# Patient Record
Sex: FEMALE | Race: WHITE | HISPANIC OR LATINO | URBAN - METROPOLITAN AREA
[De-identification: names, ages, dates, MRNs, and addresses within clinical notes are randomized per-mention and may not be internally consistent; named-entity substitution may affect disease eponyms.]

---

## 2021-05-25 ENCOUNTER — IMMUNIZATIONS (OUTPATIENT)
Dept: FAMILY MEDICINE CLINIC | Facility: HOSPITAL | Age: 46
End: 2021-05-25

## 2021-05-25 DIAGNOSIS — Z23 ENCOUNTER FOR IMMUNIZATION: Primary | ICD-10-CM

## 2021-05-25 PROCEDURE — 0001A SARS-COV-2 / COVID-19 MRNA VACCINE (PFIZER-BIONTECH) 30 MCG: CPT

## 2021-05-25 PROCEDURE — 91300 SARS-COV-2 / COVID-19 MRNA VACCINE (PFIZER-BIONTECH) 30 MCG: CPT

## 2021-06-18 ENCOUNTER — IMMUNIZATIONS (OUTPATIENT)
Dept: FAMILY MEDICINE CLINIC | Facility: HOSPITAL | Age: 46
End: 2021-06-18

## 2021-06-18 DIAGNOSIS — Z23 ENCOUNTER FOR IMMUNIZATION: Primary | ICD-10-CM

## 2021-06-18 PROCEDURE — 91300 SARS-COV-2 / COVID-19 MRNA VACCINE (PFIZER-BIONTECH) 30 MCG: CPT

## 2021-06-18 PROCEDURE — 0002A SARS-COV-2 / COVID-19 MRNA VACCINE (PFIZER-BIONTECH) 30 MCG: CPT

## 2023-10-10 ENCOUNTER — TELEPHONE (OUTPATIENT)
Dept: OTHER | Facility: OTHER | Age: 48
End: 2023-10-10

## 2023-10-10 NOTE — TELEPHONE ENCOUNTER
PT. Called in stating she couldn't get through to office since yesterday and was she having issues logging in to [facility]. PT was trying to cancel her appointment she had today, 10/10 @ 7. Please call her back to reschedule.

## 2023-11-03 ENCOUNTER — OFFICE VISIT (OUTPATIENT)
Dept: FAMILY MEDICINE CLINIC | Facility: CLINIC | Age: 48
End: 2023-11-03
Payer: COMMERCIAL

## 2023-11-03 VITALS
DIASTOLIC BLOOD PRESSURE: 80 MMHG | SYSTOLIC BLOOD PRESSURE: 112 MMHG | HEIGHT: 62 IN | TEMPERATURE: 97 F | WEIGHT: 196 LBS | HEART RATE: 73 BPM | BODY MASS INDEX: 36.07 KG/M2 | OXYGEN SATURATION: 99 %

## 2023-11-03 DIAGNOSIS — Z13.6 SCREENING FOR CARDIOVASCULAR CONDITION: ICD-10-CM

## 2023-11-03 DIAGNOSIS — R58 EXCESSIVE BLEEDING: ICD-10-CM

## 2023-11-03 DIAGNOSIS — Z12.31 SCREENING MAMMOGRAM, ENCOUNTER FOR: ICD-10-CM

## 2023-11-03 DIAGNOSIS — L81.1 MELASMA: ICD-10-CM

## 2023-11-03 DIAGNOSIS — E55.9 VITAMIN D DEFICIENCY: ICD-10-CM

## 2023-11-03 DIAGNOSIS — Z12.11 SCREEN FOR COLON CANCER: ICD-10-CM

## 2023-11-03 DIAGNOSIS — F90.2 ATTENTION DEFICIT HYPERACTIVITY DISORDER (ADHD), COMBINED TYPE: ICD-10-CM

## 2023-11-03 DIAGNOSIS — E66.9 OBESITY (BMI 35.0-39.9 WITHOUT COMORBIDITY): Primary | ICD-10-CM

## 2023-11-03 DIAGNOSIS — F41.9 ANXIETY: ICD-10-CM

## 2023-11-03 PROCEDURE — 99204 OFFICE O/P NEW MOD 45 MIN: CPT

## 2023-11-03 RX ORDER — METHOCARBAMOL 500 MG/1
TABLET, FILM COATED ORAL
Qty: 30 TABLET | Refills: 0 | Status: SHIPPED | OUTPATIENT
Start: 2023-11-03

## 2023-11-03 RX ORDER — HYDROXYZINE HYDROCHLORIDE 10 MG/1
TABLET, FILM COATED ORAL
Qty: 30 TABLET | Refills: 0 | Status: SHIPPED | OUTPATIENT
Start: 2023-11-03

## 2023-11-03 RX ORDER — DEXTROAMPHETAMINE SACCHARATE, AMPHETAMINE ASPARTATE, DEXTROAMPHETAMINE SULFATE AND AMPHETAMINE SULFATE 2.5; 2.5; 2.5; 2.5 MG/1; MG/1; MG/1; MG/1
10 TABLET ORAL
Qty: 60 TABLET | Refills: 0 | Status: SHIPPED | OUTPATIENT
Start: 2023-11-03

## 2023-11-03 NOTE — ASSESSMENT & PLAN NOTE
Has been on Ozempic for approximately 2 months. Has lost 10 pounds. We will reach out to insurance to see if they will cover [facility]. Have all fasting lab work we will call with results. If [facility] is covered we will send 1 mg weekly patient will verify that pharmacy has the 1 mg dose. She has been dieting and exercising and this has not proved to help with weight loss. She also does intermittent fasting. Follow-up in 1 month. Suggest lean protein high fiber diet. 5-9 vegetables a day. Do not drink sugary drinks.

## 2023-11-03 NOTE — ASSESSMENT & PLAN NOTE
Excessive uterine bleeding and pain with menstrual period. Please have ultrasound. We will send a muscle rack ask her to use sparingly as needed during menstrual period.

## 2023-11-03 NOTE — ASSESSMENT & PLAN NOTE
Practices self regulation. Has taken Ativan and Xanax in the past to help with anxiety. Will start hydroxyzine 1 to 2 tablets as needed for anxiety. Follow-up in 1 month. At this time is not interested in seeing a therapist or starting a daily medicine.

## 2023-11-03 NOTE — ASSESSMENT & PLAN NOTE
Patient with history of dark patches on skin. She has tried multiple over-the-counter's without success. We will start low-dose tretinoin nightly follow-up in 1 month. She will verify with insurance to make sure it is covered.

## 2023-11-03 NOTE — PROGRESS NOTES
Assessment/Plan:         Problem List Items Addressed This Visit        Musculoskeletal and Integument    Melasma     Patient with history of dark patches on skin. She has tried multiple over-the-counter's without success. We will start low-dose tretinoin nightly follow-up in 1 month. She will verify with insurance to make sure it is covered. Relevant Medications    tretinoin (RETIN-A) 0.025 % cream       Other    Obesity (BMI 35.0-39.9 without comorbidity) - Primary     Has been on Ozempic for approximately 2 months. Has lost 10 pounds. We will reach out to insurance to see if they will cover [facility]. Have all fasting lab work we will call with results. If [facility] is covered we will send 1 mg weekly patient will verify that pharmacy has the 1 mg dose. She has been dieting and exercising and this has not proved to help with weight loss. She also does intermittent fasting. Follow-up in 1 month. Suggest lean protein high fiber diet. 5-9 vegetables a day. Do not drink sugary drinks. Relevant Medications    Semaglutide-Weight Management (WEGOVY) 1 MG/0.5ML    Other Relevant Orders    CBC and differential    Comprehensive metabolic panel    Lipid panel    TSH, 3rd generation with Free T4 reflex    Vitamin D 25 hydroxy    Iron Panel (Includes Ferritin, Iron Sat%, Iron, and TIBC)    Excessive bleeding     Excessive uterine bleeding and pain with menstrual period. Please have ultrasound. We will send a muscle rack ask her to use sparingly as needed during menstrual period. Relevant Medications    methocarbamol (ROBAXIN) 500 mg tablet    Other Relevant Orders    US abdomen and pelvis with transvaginal    Attention deficit hyperactivity disorder (ADHD), combined type     Screening positive today. Scanned into chart. Was on Adderall in the past.  Felt that Adderall dose was possibly too high but the medication itself worked well.   Will start low-dose short acting Adderall once to twice daily as needed. Take with food. Call with any side effects. Follow-up in 1 month. Relevant Medications    Semaglutide-Weight Management (WEGOVY) 1 MG/0.5ML    hydrOXYzine HCL (ATARAX) 10 mg tablet    amphetamine-dextroamphetamine (ADDERALL, 10MG,) 10 mg tablet    Anxiety     Practices self regulation. Has taken Ativan and Xanax in the past to help with anxiety. Will start hydroxyzine 1 to 2 tablets as needed for anxiety. Follow-up in 1 month. At this time is not interested in seeing a therapist or starting a daily medicine. Relevant Medications    hydrOXYzine HCL (ATARAX) 10 mg tablet   Other Visit Diagnoses     Screening for cardiovascular condition        Have lab work we will call with results. Relevant Orders    CBC and differential    Comprehensive metabolic panel    Lipid panel    TSH, 3rd generation with Free T4 reflex    Vitamin D deficiency        Have lab work we will call with results    Relevant Orders    Vitamin D 25 hydroxy    Screening mammogram, encounter for        Have mammogram we will call with results    Relevant Orders    Mammo screening bilateral w 3d & cad    Screen for colon cancer        Please make appointment with GI. Relevant Orders    Ambulatory Referral to Gastroenterology              Subjective:      Patient ID: Mary Godoy is a 56 y.o. female. Moraima Jax is here to establish, she has been taking ozempic and has lost 10 lbs, she is doing . 5mg right now. She lost a lot a lot of weight in the past and feels best at 170 lbs. She hasn't seen a provider since 2019. She also suffers from anxiety and ADHD and it is affecting her work. 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 The anxiety gives her stomach pain/cramps. She does have chest pain either from stress or period.          The following portions of the patient's history were reviewed and updated as appropriate:   Past Medical History:  She has no past medical history on file.,  _______________________________________________________________________  Medical Problems:  does not have any pertinent problems on file.,  _______________________________________________________________________  Past Surgical History:   has no past surgical history on file.,  _______________________________________________________________________  Family History:  family history is not on file.,  _______________________________________________________________________  Social History:   reports that she has never smoked. She uses smokeless tobacco. She reports that she does not currently use alcohol. No history on file for drug use.,  _______________________________________________________________________  Allergies:  has No Known Allergies. .  _______________________________________________________________________  Current Outpatient Medications   Medication Sig Dispense Refill   • amphetamine-dextroamphetamine (ADDERALL, 10MG,) 10 mg tablet Take 1 tablet (10 mg total) by mouth 2 (two) times a day Max Daily Amount: 20 mg 60 tablet 0   • hydrOXYzine HCL (ATARAX) 10 mg tablet Take 1-2 tablets as needed for anxiety every 6 hours. 30 tablet 0   • methocarbamol (ROBAXIN) 500 mg tablet Take 1-2 tablets as needed for muscular pain during menstrual period every 8 hours. Caution with driving and avoid all alcohol use while taking medicine. 30 tablet 0   • semaglutide, 0.25 or 0.5 mg/dose, (Ozempic, 0.25 or 0.5 MG/DOSE,) 2 mg/1.5 mL injection pen Inject under the skin every 7 days     • Semaglutide-Weight Management (WEGOVY) 1 MG/0.5ML Inject 0.5 mL (1 mg total) under the skin once a week 2 mL 0   • tretinoin (RETIN-A) 0.025 % cream Apply topically daily at bedtime 45 g 1     No current facility-administered medications for this visit.     _______________________________________________________________________  Review of Systems   Constitutional:  Negative for chills, diaphoresis, fatigue and fever. HENT:  Negative for congestion, ear pain, postnasal drip, rhinorrhea, sinus pressure, sinus pain and sore throat. Eyes:  Negative for pain and visual disturbance. Respiratory:  Negative for cough, chest tightness, shortness of breath and wheezing. Cardiovascular:  Positive for chest pain. Negative for palpitations. Gastrointestinal:  Positive for abdominal pain. Negative for constipation, diarrhea, nausea and vomiting. Genitourinary:  Positive for menstrual problem. Negative for dysuria, frequency and hematuria. Musculoskeletal:  Negative for arthralgias and back pain. Skin:  Negative for color change and rash. Neurological:  Negative for dizziness, seizures, syncope, light-headedness and headaches. Psychiatric/Behavioral:  Positive for sleep disturbance. Negative for dysphoric mood. The patient is nervous/anxious. All other systems reviewed and are negative. Objective:  Vitals:    11/03/23 0721   BP: 112/80   BP Location: Left arm   Patient Position: Sitting   Cuff Size: Standard   Pulse: 73   Temp: (!) 97 °F (36.1 °C)   TempSrc: Tympanic   SpO2: 99%   Weight: 88.9 kg (196 lb)   Height: 5' 2" (1.575 m)     Body mass index is 35.85 kg/m². Physical Exam  Vitals and nursing note reviewed. Constitutional:       General: She is not in acute distress. Appearance: Normal appearance. She is not ill-appearing. HENT:      Head: Normocephalic. Right Ear: Tympanic membrane, ear canal and external ear normal. There is no impacted cerumen. Left Ear: Tympanic membrane, ear canal and external ear normal. There is no impacted cerumen. Nose: Nose normal. No congestion. Mouth/Throat:      Mouth: Mucous membranes are moist.      Pharynx: No posterior oropharyngeal erythema. Eyes:      Extraocular Movements: Extraocular movements intact. Conjunctiva/sclera: Conjunctivae normal.      Pupils: Pupils are equal, round, and reactive to light.    Cardiovascular:      Rate and Rhythm: Normal rate and regular rhythm. Pulses: Normal pulses. Heart sounds: Normal heart sounds. Pulmonary:      Effort: Pulmonary effort is normal. No respiratory distress. Breath sounds: Normal breath sounds. No wheezing. Abdominal:      General: Bowel sounds are normal.      Palpations: Abdomen is soft. Musculoskeletal:         General: No swelling or tenderness. Normal range of motion. Cervical back: Normal range of motion. No tenderness. Right lower leg: No edema. Left lower leg: No edema. Lymphadenopathy:      Cervical: No cervical adenopathy. Skin:     General: Skin is warm and dry. Neurological:      General: No focal deficit present. Mental Status: She is alert and oriented to person, place, and time. Psychiatric:         Mood and Affect: Mood normal.         Behavior: Behavior normal.         Thought Content:  Thought content normal.         Judgment: Judgment normal.

## 2023-11-03 NOTE — ASSESSMENT & PLAN NOTE
Screening positive today. Scanned into chart. Was on Adderall in the past.  Felt that Adderall dose was possibly too high but the medication itself worked well. Will start low-dose short acting Adderall once to twice daily as needed. Take with food. Call with any side effects. Follow-up in 1 month.

## 2023-11-06 ENCOUNTER — TELEPHONE (OUTPATIENT)
Dept: FAMILY MEDICINE CLINIC | Facility: CLINIC | Age: 48
End: 2023-11-06

## 2023-11-06 NOTE — TELEPHONE ENCOUNTER
Prior auth started for [facility] via [address].  Key: [id number]

## 2023-12-04 DIAGNOSIS — F90.2 ATTENTION DEFICIT HYPERACTIVITY DISORDER (ADHD), COMBINED TYPE: ICD-10-CM

## 2023-12-04 DIAGNOSIS — F41.9 ANXIETY: ICD-10-CM

## 2023-12-04 DIAGNOSIS — L81.1 MELASMA: ICD-10-CM

## 2023-12-04 RX ORDER — HYDROXYZINE HYDROCHLORIDE 10 MG/1
TABLET, FILM COATED ORAL
Qty: 30 TABLET | Refills: 0 | Status: SHIPPED | OUTPATIENT
Start: 2023-12-04

## 2023-12-04 RX ORDER — DEXTROAMPHETAMINE SACCHARATE, AMPHETAMINE ASPARTATE, DEXTROAMPHETAMINE SULFATE AND AMPHETAMINE SULFATE 2.5; 2.5; 2.5; 2.5 MG/1; MG/1; MG/1; MG/1
10 TABLET ORAL
Qty: 60 TABLET | Refills: 0 | Status: SHIPPED | OUTPATIENT
Start: 2023-12-04

## 2024-01-12 DIAGNOSIS — F90.2 ATTENTION DEFICIT HYPERACTIVITY DISORDER (ADHD), COMBINED TYPE: ICD-10-CM

## 2024-01-12 RX ORDER — DEXTROAMPHETAMINE SACCHARATE, AMPHETAMINE ASPARTATE, DEXTROAMPHETAMINE SULFATE AND AMPHETAMINE SULFATE 2.5; 2.5; 2.5; 2.5 MG/1; MG/1; MG/1; MG/1
10 TABLET ORAL
Qty: 60 TABLET | Refills: 0 | Status: SHIPPED | OUTPATIENT
Start: 2024-01-12 | End: 2024-01-19

## 2024-01-12 NOTE — TELEPHONE ENCOUNTER
Spoke with patient- she is COMPLETELY OUT of her medication, has been for 4 days. Can someone call this in for her today on Tonya's behalf?    Also. She would appreciate a call back from Tonya next week in regard to her anxiety medication. She does not know why she is on that particular medication.     Please advise, thank you!

## 2024-01-19 ENCOUNTER — TELEPHONE (OUTPATIENT)
Dept: FAMILY MEDICINE CLINIC | Facility: CLINIC | Age: 49
End: 2024-01-19

## 2024-01-19 DIAGNOSIS — F41.9 ANXIETY: Primary | ICD-10-CM

## 2024-01-19 DIAGNOSIS — F90.2 ATTENTION DEFICIT HYPERACTIVITY DISORDER (ADHD), COMBINED TYPE: ICD-10-CM

## 2024-01-19 RX ORDER — DEXTROAMPHETAMINE SACCHARATE, AMPHETAMINE ASPARTATE MONOHYDRATE, DEXTROAMPHETAMINE SULFATE AND AMPHETAMINE SULFATE 2.5; 2.5; 2.5; 2.5 MG/1; MG/1; MG/1; MG/1
10 CAPSULE, EXTENDED RELEASE ORAL EVERY MORNING
Qty: 15 CAPSULE | Refills: 0 | Status: SHIPPED | OUTPATIENT
Start: 2024-01-19

## 2024-01-19 RX ORDER — ALPRAZOLAM 0.25 MG/1
0.25 TABLET ORAL 3 TIMES DAILY PRN
Qty: 30 TABLET | Refills: 0 | Status: SHIPPED | OUTPATIENT
Start: 2024-01-19

## 2024-01-19 NOTE — TELEPHONE ENCOUNTER
Patient called stating she would like to speak with Tonya regarding her Hydroxyzine 10 mg (patient says it is her anxiety meds. She doesn't think they are working. Asked if she would like to make an appt with Tonya to discuss but she is unable. Please advise  [facility]

## 2024-02-17 DIAGNOSIS — L81.1 MELASMA: ICD-10-CM

## 2024-02-17 DIAGNOSIS — F90.2 ATTENTION DEFICIT HYPERACTIVITY DISORDER (ADHD), COMBINED TYPE: ICD-10-CM

## 2024-02-17 DIAGNOSIS — F41.9 ANXIETY: ICD-10-CM

## 2024-02-20 ENCOUNTER — TELEPHONE (OUTPATIENT)
Dept: FAMILY MEDICINE CLINIC | Facility: CLINIC | Age: 49
End: 2024-02-20

## 2024-02-21 RX ORDER — DEXTROAMPHETAMINE SACCHARATE, AMPHETAMINE ASPARTATE MONOHYDRATE, DEXTROAMPHETAMINE SULFATE AND AMPHETAMINE SULFATE 2.5; 2.5; 2.5; 2.5 MG/1; MG/1; MG/1; MG/1
10 CAPSULE, EXTENDED RELEASE ORAL EVERY MORNING
Qty: 15 CAPSULE | Refills: 0 | Status: SHIPPED | OUTPATIENT
Start: 2024-02-21

## 2024-02-21 RX ORDER — ALPRAZOLAM 0.25 MG/1
0.25 TABLET ORAL 3 TIMES DAILY PRN
Qty: 30 TABLET | Refills: 0 | Status: SHIPPED | OUTPATIENT
Start: 2024-02-21

## 2024-03-05 DIAGNOSIS — R58 EXCESSIVE BLEEDING: ICD-10-CM

## 2024-03-05 DIAGNOSIS — F90.2 ATTENTION DEFICIT HYPERACTIVITY DISORDER (ADHD), COMBINED TYPE: ICD-10-CM

## 2024-03-05 RX ORDER — DEXTROAMPHETAMINE SACCHARATE, AMPHETAMINE ASPARTATE MONOHYDRATE, DEXTROAMPHETAMINE SULFATE AND AMPHETAMINE SULFATE 2.5; 2.5; 2.5; 2.5 MG/1; MG/1; MG/1; MG/1
10 CAPSULE, EXTENDED RELEASE ORAL EVERY MORNING
Qty: 15 CAPSULE | Refills: 0 | Status: SHIPPED | OUTPATIENT
Start: 2024-03-05

## 2024-03-05 NOTE — TELEPHONE ENCOUNTER
Patient called back. She stated that she sees that the prescription has been approved, however, she hung up with the pharmacy about 20 minutes after Tonya sent the script and they told patient that they would not fill it without a verbal authorization from our office.    Please advise, thank you!

## 2024-03-05 NOTE — TELEPHONE ENCOUNTER
Patient called for a refill for Adderall XR 10 mg. Asked for a month's worth. Said she only received half last time.   [facility]

## 2024-03-06 ENCOUNTER — TELEPHONE (OUTPATIENT)
Dept: FAMILY MEDICINE CLINIC | Facility: CLINIC | Age: 49
End: 2024-03-06

## 2024-03-06 NOTE — TELEPHONE ENCOUNTER
Called patient and made aware but patient offered to pay out of pocket for the 30 day supply.  Thanks

## 2024-03-06 NOTE — TELEPHONE ENCOUNTER
She actually needs a prior Auth through her insurance it looks like. We will start it today but let her know it can take up to 3 days to hear back from them., we will reach ut when we hear dirk

## 2024-03-06 NOTE — TELEPHONE ENCOUNTER
Patient called in and stated that the Pharmacy needs a verbal authorization for the Adderall.  Please can someone take care of in Tonya's absence.  Please advise.  Thanks

## 2024-03-07 RX ORDER — METHOCARBAMOL 500 MG/1
TABLET, FILM COATED ORAL
Qty: 30 TABLET | Refills: 0 | Status: SHIPPED | OUTPATIENT
Start: 2024-03-07

## 2024-03-18 DIAGNOSIS — L81.1 MELASMA: ICD-10-CM

## 2024-03-18 DIAGNOSIS — R58 EXCESSIVE BLEEDING: ICD-10-CM

## 2024-03-18 DIAGNOSIS — F41.9 ANXIETY: ICD-10-CM

## 2024-03-18 DIAGNOSIS — F90.2 ATTENTION DEFICIT HYPERACTIVITY DISORDER (ADHD), COMBINED TYPE: ICD-10-CM

## 2024-03-18 RX ORDER — DEXTROAMPHETAMINE SACCHARATE, AMPHETAMINE ASPARTATE MONOHYDRATE, DEXTROAMPHETAMINE SULFATE AND AMPHETAMINE SULFATE 2.5; 2.5; 2.5; 2.5 MG/1; MG/1; MG/1; MG/1
10 CAPSULE, EXTENDED RELEASE ORAL EVERY MORNING
Qty: 15 CAPSULE | Refills: 0 | Status: SHIPPED | OUTPATIENT
Start: 2024-03-18

## 2024-03-18 RX ORDER — METHOCARBAMOL 500 MG/1
TABLET, FILM COATED ORAL
Qty: 30 TABLET | Refills: 0 | Status: SHIPPED | OUTPATIENT
Start: 2024-03-18

## 2024-03-18 RX ORDER — ALPRAZOLAM 0.25 MG/1
0.25 TABLET ORAL 3 TIMES DAILY PRN
Qty: 30 TABLET | Refills: 0 | Status: SHIPPED | OUTPATIENT
Start: 2024-03-18

## 2024-04-08 DIAGNOSIS — L81.1 MELASMA: ICD-10-CM

## 2024-04-08 DIAGNOSIS — F90.2 ATTENTION DEFICIT HYPERACTIVITY DISORDER (ADHD), COMBINED TYPE: ICD-10-CM

## 2024-04-08 RX ORDER — DEXTROAMPHETAMINE SACCHARATE, AMPHETAMINE ASPARTATE MONOHYDRATE, DEXTROAMPHETAMINE SULFATE AND AMPHETAMINE SULFATE 2.5; 2.5; 2.5; 2.5 MG/1; MG/1; MG/1; MG/1
10 CAPSULE, EXTENDED RELEASE ORAL EVERY MORNING
Qty: 15 CAPSULE | Refills: 0 | Status: SHIPPED | OUTPATIENT
Start: 2024-04-08

## 2024-04-08 NOTE — TELEPHONE ENCOUNTER
"Patient is requesting Adderall RX, she states that \"15 days isnt enough and she needs 30 days\"   "

## 2024-04-17 ENCOUNTER — TELEPHONE (OUTPATIENT)
Dept: FAMILY MEDICINE CLINIC | Facility: CLINIC | Age: 49
End: 2024-04-17

## 2024-04-17 NOTE — TELEPHONE ENCOUNTER
PA submitted via cover my med's for Tretinoin 0.025% cream    Key #[id number]   PA Case ID #: [id number]

## 2024-04-22 DIAGNOSIS — L81.1 MELASMA: Primary | ICD-10-CM

## 2024-04-22 DIAGNOSIS — F41.9 ANXIETY: ICD-10-CM

## 2024-04-22 DIAGNOSIS — F90.2 ATTENTION DEFICIT HYPERACTIVITY DISORDER (ADHD), COMBINED TYPE: ICD-10-CM

## 2024-04-23 RX ORDER — ALPRAZOLAM 0.25 MG/1
0.25 TABLET ORAL 3 TIMES DAILY PRN
Qty: 30 TABLET | Refills: 0 | Status: SHIPPED | OUTPATIENT
Start: 2024-04-23

## 2024-04-23 RX ORDER — DEXTROAMPHETAMINE SACCHARATE, AMPHETAMINE ASPARTATE MONOHYDRATE, DEXTROAMPHETAMINE SULFATE AND AMPHETAMINE SULFATE 2.5; 2.5; 2.5; 2.5 MG/1; MG/1; MG/1; MG/1
10 CAPSULE, EXTENDED RELEASE ORAL EVERY MORNING
Qty: 15 CAPSULE | Refills: 0 | Status: SHIPPED | OUTPATIENT
Start: 2024-04-23 | End: 2024-05-06 | Stop reason: SDUPTHER

## 2024-04-25 ENCOUNTER — TELEPHONE (OUTPATIENT)
Dept: FAMILY MEDICINE CLINIC | Facility: CLINIC | Age: 49
End: 2024-04-25

## 2024-04-25 NOTE — TELEPHONE ENCOUNTER
PA for tretinoin (RETIN-A) 0.025 % cream     Submitted via    []CMPoken-KEY   [x]enVerid-Case ID #   []Faxed to plan   []Other website   []Phone call Case ID #     Office notes sent, clinical questions answered. Awaiting determination    Turnaround time for your insurance to make a decision on your Prior Authorization can take 7-21 business days.

## 2024-04-25 NOTE — TELEPHONE ENCOUNTER
Prior Auth request received via fax from [facility] pharmacy 4/24     tretinoin (RETIN-A) 0.025 % cream     Key [id number]

## 2024-04-29 NOTE — TELEPHONE ENCOUNTER
PA for  tretinoin (RETIN-A) 0.025 % cream  Denied    Reason:                  Message sent to provider pool Yes    Denial letter scanned into Media Yes    Appeal started No    (Provider will need to decide if appeal is warranted and send clinical documentation to PA team for initiation.)

## 2024-05-06 DIAGNOSIS — R58 EXCESSIVE BLEEDING: ICD-10-CM

## 2024-05-06 DIAGNOSIS — F90.2 ATTENTION DEFICIT HYPERACTIVITY DISORDER (ADHD), COMBINED TYPE: ICD-10-CM

## 2024-05-06 DIAGNOSIS — F41.9 ANXIETY: ICD-10-CM

## 2024-05-06 DIAGNOSIS — L81.1 MELASMA: ICD-10-CM

## 2024-05-07 RX ORDER — METHOCARBAMOL 500 MG/1
TABLET, FILM COATED ORAL
Qty: 30 TABLET | Refills: 0 | Status: SHIPPED | OUTPATIENT
Start: 2024-05-07

## 2024-05-07 RX ORDER — HYDROXYZINE HYDROCHLORIDE 10 MG/1
TABLET, FILM COATED ORAL
Qty: 30 TABLET | Refills: 0 | Status: SHIPPED | OUTPATIENT
Start: 2024-05-07

## 2024-05-07 RX ORDER — DEXTROAMPHETAMINE SACCHARATE, AMPHETAMINE ASPARTATE MONOHYDRATE, DEXTROAMPHETAMINE SULFATE AND AMPHETAMINE SULFATE 2.5; 2.5; 2.5; 2.5 MG/1; MG/1; MG/1; MG/1
10 CAPSULE, EXTENDED RELEASE ORAL EVERY MORNING
Qty: 15 CAPSULE | Refills: 0 | Status: SHIPPED | OUTPATIENT
Start: 2024-05-07

## 2024-05-20 DIAGNOSIS — F41.9 ANXIETY: ICD-10-CM

## 2024-05-20 DIAGNOSIS — F90.2 ATTENTION DEFICIT HYPERACTIVITY DISORDER (ADHD), COMBINED TYPE: ICD-10-CM

## 2024-05-20 DIAGNOSIS — R58 EXCESSIVE BLEEDING: ICD-10-CM

## 2024-05-21 RX ORDER — ALPRAZOLAM 0.25 MG/1
0.25 TABLET ORAL 3 TIMES DAILY PRN
Qty: 30 TABLET | Refills: 0 | Status: SHIPPED | OUTPATIENT
Start: 2024-05-21

## 2024-05-21 RX ORDER — DEXTROAMPHETAMINE SACCHARATE, AMPHETAMINE ASPARTATE MONOHYDRATE, DEXTROAMPHETAMINE SULFATE AND AMPHETAMINE SULFATE 2.5; 2.5; 2.5; 2.5 MG/1; MG/1; MG/1; MG/1
10 CAPSULE, EXTENDED RELEASE ORAL EVERY MORNING
Qty: 15 CAPSULE | Refills: 0 | Status: SHIPPED | OUTPATIENT
Start: 2024-05-21

## 2024-05-21 RX ORDER — METHOCARBAMOL 500 MG/1
TABLET, FILM COATED ORAL
Qty: 30 TABLET | Refills: 0 | Status: SHIPPED | OUTPATIENT
Start: 2024-05-21

## 2024-06-04 DIAGNOSIS — F90.2 ATTENTION DEFICIT HYPERACTIVITY DISORDER (ADHD), COMBINED TYPE: ICD-10-CM

## 2024-06-04 DIAGNOSIS — L81.1 MELASMA: ICD-10-CM

## 2024-06-04 RX ORDER — DEXTROAMPHETAMINE SACCHARATE, AMPHETAMINE ASPARTATE MONOHYDRATE, DEXTROAMPHETAMINE SULFATE AND AMPHETAMINE SULFATE 2.5; 2.5; 2.5; 2.5 MG/1; MG/1; MG/1; MG/1
10 CAPSULE, EXTENDED RELEASE ORAL EVERY MORNING
Qty: 15 CAPSULE | Refills: 0 | Status: SHIPPED | OUTPATIENT
Start: 2024-06-04

## 2024-07-13 DIAGNOSIS — F41.9 ANXIETY: ICD-10-CM

## 2024-07-13 DIAGNOSIS — R58 EXCESSIVE BLEEDING: ICD-10-CM

## 2024-07-13 DIAGNOSIS — F90.2 ATTENTION DEFICIT HYPERACTIVITY DISORDER (ADHD), COMBINED TYPE: ICD-10-CM

## 2024-07-15 RX ORDER — METHOCARBAMOL 500 MG/1
TABLET, FILM COATED ORAL
Qty: 30 TABLET | Refills: 0 | Status: SHIPPED | OUTPATIENT
Start: 2024-07-15

## 2024-07-15 RX ORDER — DEXTROAMPHETAMINE SACCHARATE, AMPHETAMINE ASPARTATE MONOHYDRATE, DEXTROAMPHETAMINE SULFATE AND AMPHETAMINE SULFATE 2.5; 2.5; 2.5; 2.5 MG/1; MG/1; MG/1; MG/1
10 CAPSULE, EXTENDED RELEASE ORAL EVERY MORNING
Qty: 15 CAPSULE | Refills: 0 | Status: SHIPPED | OUTPATIENT
Start: 2024-07-15

## 2024-07-15 RX ORDER — ALPRAZOLAM 0.25 MG/1
0.25 TABLET ORAL 3 TIMES DAILY PRN
Qty: 30 TABLET | Refills: 0 | Status: SHIPPED | OUTPATIENT
Start: 2024-07-15

## 2024-07-28 DIAGNOSIS — F41.9 ANXIETY: ICD-10-CM

## 2024-07-28 DIAGNOSIS — F90.2 ATTENTION DEFICIT HYPERACTIVITY DISORDER (ADHD), COMBINED TYPE: ICD-10-CM

## 2024-07-28 DIAGNOSIS — R58 EXCESSIVE BLEEDING: ICD-10-CM

## 2024-07-29 RX ORDER — DEXTROAMPHETAMINE SACCHARATE, AMPHETAMINE ASPARTATE MONOHYDRATE, DEXTROAMPHETAMINE SULFATE AND AMPHETAMINE SULFATE 2.5; 2.5; 2.5; 2.5 MG/1; MG/1; MG/1; MG/1
10 CAPSULE, EXTENDED RELEASE ORAL EVERY MORNING
Qty: 15 CAPSULE | Refills: 0 | Status: SHIPPED | OUTPATIENT
Start: 2024-07-29

## 2024-07-29 RX ORDER — ALPRAZOLAM 0.25 MG/1
0.25 TABLET ORAL 3 TIMES DAILY PRN
Qty: 30 TABLET | Refills: 0 | Status: SHIPPED | OUTPATIENT
Start: 2024-07-29

## 2024-07-29 RX ORDER — METHOCARBAMOL 500 MG/1
TABLET, FILM COATED ORAL
Qty: 30 TABLET | Refills: 0 | Status: SHIPPED | OUTPATIENT
Start: 2024-07-29

## 2024-08-15 DIAGNOSIS — L81.1 MELASMA: ICD-10-CM

## 2024-08-15 DIAGNOSIS — F41.9 ANXIETY: ICD-10-CM

## 2024-08-15 DIAGNOSIS — F90.2 ATTENTION DEFICIT HYPERACTIVITY DISORDER (ADHD), COMBINED TYPE: ICD-10-CM

## 2024-08-15 DIAGNOSIS — R58 EXCESSIVE BLEEDING: ICD-10-CM

## 2024-08-15 RX ORDER — ALPRAZOLAM 0.25 MG
0.25 TABLET ORAL 3 TIMES DAILY PRN
Qty: 30 TABLET | Refills: 0 | Status: SHIPPED | OUTPATIENT
Start: 2024-08-15

## 2024-08-15 RX ORDER — DEXTROAMPHETAMINE SACCHARATE, AMPHETAMINE ASPARTATE MONOHYDRATE, DEXTROAMPHETAMINE SULFATE AND AMPHETAMINE SULFATE 2.5; 2.5; 2.5; 2.5 MG/1; MG/1; MG/1; MG/1
10 CAPSULE, EXTENDED RELEASE ORAL EVERY MORNING
Qty: 15 CAPSULE | Refills: 0 | Status: SHIPPED | OUTPATIENT
Start: 2024-08-15

## 2024-08-15 RX ORDER — TRETINOIN 0.25 MG/G
CREAM TOPICAL
Qty: 45 G | Refills: 2 | Status: SHIPPED | OUTPATIENT
Start: 2024-08-15

## 2024-08-15 RX ORDER — METHOCARBAMOL 500 MG/1
TABLET, FILM COATED ORAL
Qty: 30 TABLET | Refills: 0 | Status: SHIPPED | OUTPATIENT
Start: 2024-08-15

## 2024-08-19 ENCOUNTER — TELEPHONE (OUTPATIENT)
Dept: FAMILY MEDICINE CLINIC | Facility: CLINIC | Age: 49
End: 2024-08-19

## 2024-08-28 DIAGNOSIS — F90.2 ATTENTION DEFICIT HYPERACTIVITY DISORDER (ADHD), COMBINED TYPE: ICD-10-CM

## 2024-08-28 DIAGNOSIS — L81.1 MELASMA: ICD-10-CM

## 2024-08-28 DIAGNOSIS — F41.9 ANXIETY: ICD-10-CM

## 2024-08-28 DIAGNOSIS — R58 EXCESSIVE BLEEDING: ICD-10-CM

## 2024-08-29 RX ORDER — METHOCARBAMOL 500 MG/1
TABLET, FILM COATED ORAL
Qty: 30 TABLET | Refills: 0 | Status: SHIPPED | OUTPATIENT
Start: 2024-08-29

## 2024-08-29 RX ORDER — ALPRAZOLAM 0.25 MG
0.25 TABLET ORAL 3 TIMES DAILY PRN
Qty: 30 TABLET | Refills: 0 | Status: SHIPPED | OUTPATIENT
Start: 2024-08-29

## 2024-08-29 RX ORDER — TRETINOIN 0.25 MG/G
CREAM TOPICAL
Qty: 45 G | Refills: 0 | OUTPATIENT
Start: 2024-08-29

## 2024-08-29 RX ORDER — DEXTROAMPHETAMINE SACCHARATE, AMPHETAMINE ASPARTATE MONOHYDRATE, DEXTROAMPHETAMINE SULFATE AND AMPHETAMINE SULFATE 2.5; 2.5; 2.5; 2.5 MG/1; MG/1; MG/1; MG/1
10 CAPSULE, EXTENDED RELEASE ORAL EVERY MORNING
Qty: 15 CAPSULE | Refills: 0 | Status: SHIPPED | OUTPATIENT
Start: 2024-08-29

## 2024-09-09 DIAGNOSIS — F90.2 ATTENTION DEFICIT HYPERACTIVITY DISORDER (ADHD), COMBINED TYPE: ICD-10-CM

## 2024-09-09 DIAGNOSIS — F41.9 ANXIETY: ICD-10-CM

## 2024-09-09 DIAGNOSIS — R58 EXCESSIVE BLEEDING: ICD-10-CM

## 2024-09-10 RX ORDER — METHOCARBAMOL 500 MG/1
TABLET, FILM COATED ORAL
Qty: 30 TABLET | Refills: 0 | Status: SHIPPED | OUTPATIENT
Start: 2024-09-10

## 2024-09-10 RX ORDER — ALPRAZOLAM 0.25 MG
0.25 TABLET ORAL 3 TIMES DAILY PRN
Qty: 30 TABLET | Refills: 0 | Status: SHIPPED | OUTPATIENT
Start: 2024-09-10

## 2024-09-10 RX ORDER — DEXTROAMPHETAMINE SACCHARATE, AMPHETAMINE ASPARTATE MONOHYDRATE, DEXTROAMPHETAMINE SULFATE AND AMPHETAMINE SULFATE 2.5; 2.5; 2.5; 2.5 MG/1; MG/1; MG/1; MG/1
10 CAPSULE, EXTENDED RELEASE ORAL EVERY MORNING
Qty: 15 CAPSULE | Refills: 0 | Status: SHIPPED | OUTPATIENT
Start: 2024-09-10

## 2024-09-24 ENCOUNTER — OFFICE VISIT (OUTPATIENT)
Dept: FAMILY MEDICINE CLINIC | Facility: CLINIC | Age: 49
End: 2024-09-24
Payer: COMMERCIAL

## 2024-09-24 VITALS
TEMPERATURE: 97.7 F | HEART RATE: 66 BPM | DIASTOLIC BLOOD PRESSURE: 82 MMHG | WEIGHT: 180 LBS | BODY MASS INDEX: 33.13 KG/M2 | HEIGHT: 62 IN | OXYGEN SATURATION: 99 % | SYSTOLIC BLOOD PRESSURE: 118 MMHG

## 2024-09-24 DIAGNOSIS — F41.9 ANXIETY: ICD-10-CM

## 2024-09-24 DIAGNOSIS — E55.9 VITAMIN D DEFICIENCY: ICD-10-CM

## 2024-09-24 DIAGNOSIS — M54.32 SCIATICA OF LEFT SIDE: ICD-10-CM

## 2024-09-24 DIAGNOSIS — L81.1 MELASMA: ICD-10-CM

## 2024-09-24 DIAGNOSIS — F90.2 ATTENTION DEFICIT HYPERACTIVITY DISORDER (ADHD), COMBINED TYPE: ICD-10-CM

## 2024-09-24 DIAGNOSIS — R58 EXCESSIVE BLEEDING: Primary | ICD-10-CM

## 2024-09-24 DIAGNOSIS — Z13.6 SCREENING FOR CARDIOVASCULAR CONDITION: ICD-10-CM

## 2024-09-24 DIAGNOSIS — G62.9 NEUROPATHY: ICD-10-CM

## 2024-09-24 PROCEDURE — 99214 OFFICE O/P EST MOD 30 MIN: CPT

## 2024-09-24 RX ORDER — TRETINOIN 0.25 MG/G
CREAM TOPICAL
Qty: 45 G | Refills: 2 | Status: SHIPPED | OUTPATIENT
Start: 2024-09-24

## 2024-09-24 RX ORDER — DEXTROAMPHETAMINE SACCHARATE, AMPHETAMINE ASPARTATE MONOHYDRATE, DEXTROAMPHETAMINE SULFATE AND AMPHETAMINE SULFATE 2.5; 2.5; 2.5; 2.5 MG/1; MG/1; MG/1; MG/1
10 CAPSULE, EXTENDED RELEASE ORAL EVERY MORNING
Qty: 30 CAPSULE | Refills: 0 | Status: SHIPPED | OUTPATIENT
Start: 2024-09-24

## 2024-09-24 RX ORDER — FLUOXETINE 10 MG/1
10 TABLET, FILM COATED ORAL DAILY
Qty: 30 TABLET | Refills: 5 | Status: SHIPPED | OUTPATIENT
Start: 2024-09-24

## 2024-09-24 RX ORDER — IBUPROFEN 800 MG/1
800 TABLET, FILM COATED ORAL EVERY 8 HOURS PRN
Qty: 30 TABLET | Refills: 0 | Status: SHIPPED | OUTPATIENT
Start: 2024-09-24

## 2024-09-24 NOTE — PROGRESS NOTES
Assessment/Plan:         Problem List Items Addressed This Visit          Nervous and Auditory    Sciatica of left side     please have xray and make follow up with ortho          Relevant Medications    ibuprofen (MOTRIN) 800 mg tablet    Other Relevant Orders    XR spine lumbar minimum 4 views non injury    XR hips bilateral 5+ w pelvis if performed    Orthopedics       Musculoskeletal and Integument    Melasma     Continue retin a, has failed otc treatment         Relevant Medications    tretinoin (RETIN-A) 0.025 % cream       Behavioral Health    Attention deficit hyperactivity disorder (ADHD), combined type     Stable, will continue to monitor. Continue current treatment.          Relevant Medications    FLUoxetine 10 MG tablet    amphetamine-dextroamphetamine (ADDERALL XR, 10MG,) 10 MG 24 hr capsule    Anxiety     Will start prozac. It can take 4-6 weeks for this medication to be fully therapeutic. Call with any affects.           Relevant Medications    FLUoxetine 10 MG tablet       Blood    Excessive bleeding - Primary     Will refer to gyn          Relevant Orders    Ambulatory Referral to Obstetrics / Gynecology     Other Visit Diagnoses       Screening for cardiovascular condition        Have lab work, will call with results    Relevant Orders    CBC and differential    Comprehensive metabolic panel    Hemoglobin A1C    Lipid panel    TSH, 3rd generation with Free T4 reflex    Vitamin D deficiency        Have lab work, will call with results    Relevant Orders    Vitamin D 25 hydroxy    Neuropathy        Have lab work, will call with results    Relevant Orders    Vitamin B12    Folate    Iron Panel (Includes Ferritin, Iron Sat%, Iron, and TIBC)              Subjective:      Patient ID: Linnea Nguyen is a 48 y.o. female.    Linnea is here for adderall and menstrual problem. For the whole month of July she was severely depression. She also is having sciatic pain.     Medication Refill  Associated  symptoms include abdominal pain, arthralgias and myalgias. Pertinent negatives include no chest pain, coughing, nausea, rash or vomiting.       The following portions of the patient's history were reviewed and updated as appropriate:   Past Medical History:  She has no past medical history on file.,  _______________________________________________________________________  Medical Problems:  does not have any pertinent problems on file.,  _______________________________________________________________________  Past Surgical History:   has no past surgical history on file.,  _______________________________________________________________________  Family History:  family history is not on file.,  _______________________________________________________________________  Social History:   reports that she has never smoked. She uses smokeless tobacco. She reports that she does not currently use alcohol. No history on file for drug use.,  _______________________________________________________________________  Allergies:  has No Known Allergies..  _______________________________________________________________________  Current Outpatient Medications   Medication Sig Dispense Refill    amphetamine-dextroamphetamine (ADDERALL XR, 10MG,) 10 MG 24 hr capsule Take 1 capsule (10 mg total) by mouth every morning Max Daily Amount: 10 mg 30 capsule 0    FLUoxetine 10 MG tablet Take 1 tablet (10 mg total) by mouth daily 30 tablet 5    ibuprofen (MOTRIN) 800 mg tablet Take 1 tablet (800 mg total) by mouth every 8 (eight) hours as needed for mild pain 30 tablet 0    methocarbamol (ROBAXIN) 500 mg tablet Take 1-2 tablets as needed for muscular pain during menstrual period every 8 hours.  Caution with driving and avoid all alcohol use while taking medicine. 30 tablet 0    tretinoin (RETIN-A) 0.025 % cream Apply topically daily at bedtime 45 g 2     No current facility-administered medications for this visit.  "    _______________________________________________________________________  Review of Systems   Respiratory:  Negative for cough, chest tightness, shortness of breath and wheezing.    Cardiovascular:  Negative for chest pain and palpitations.   Gastrointestinal:  Positive for abdominal pain. Negative for constipation, diarrhea, nausea and vomiting.   Genitourinary:  Positive for menstrual problem. Negative for dysuria and hematuria.   Musculoskeletal:  Positive for arthralgias, back pain and myalgias.   Skin:  Negative for color change and rash.   Neurological:  Negative for dizziness.   Psychiatric/Behavioral:  Positive for dysphoric mood. The patient is nervous/anxious.    All other systems reviewed and are negative.        Objective:  Vitals:    09/24/24 1224   BP: 118/82   BP Location: Left arm   Patient Position: Sitting   Pulse: 66   Temp: 97.7 °F (36.5 °C)   SpO2: 99%   Weight: 81.6 kg (180 lb)   Height: 5' 2\" (1.575 m)     Body mass index is 32.92 kg/m².     Physical Exam  Vitals and nursing note reviewed.   Constitutional:       Appearance: Normal appearance. She is not ill-appearing.   HENT:      Head: Normocephalic.      Right Ear: Tympanic membrane, ear canal and external ear normal. There is no impacted cerumen.      Left Ear: Tympanic membrane, ear canal and external ear normal. There is no impacted cerumen.      Nose: Nose normal. No congestion.      Mouth/Throat:      Mouth: Mucous membranes are moist.      Pharynx: No posterior oropharyngeal erythema.   Eyes:      Extraocular Movements: Extraocular movements intact.      Conjunctiva/sclera: Conjunctivae normal.      Pupils: Pupils are equal, round, and reactive to light.   Cardiovascular:      Rate and Rhythm: Normal rate and regular rhythm.      Heart sounds: Normal heart sounds. No murmur heard.  Pulmonary:      Effort: Pulmonary effort is normal.      Breath sounds: Normal breath sounds. No wheezing.   Abdominal:      Palpations: Abdomen is " soft.      Tenderness: There is no abdominal tenderness.   Musculoskeletal:         General: Normal range of motion.      Cervical back: Normal range of motion.      Right lower leg: No edema.      Left lower leg: No edema.   Skin:     General: Skin is warm and dry.   Neurological:      General: No focal deficit present.      Mental Status: She is alert.   Psychiatric:         Mood and Affect: Mood normal.         Behavior: Behavior normal.

## 2024-09-24 NOTE — ASSESSMENT & PLAN NOTE
Will start prozac. It can take 4-6 weeks for this medication to be fully therapeutic. Call with any affects.

## 2024-10-12 DIAGNOSIS — R58 EXCESSIVE BLEEDING: ICD-10-CM

## 2024-10-12 DIAGNOSIS — F90.2 ATTENTION DEFICIT HYPERACTIVITY DISORDER (ADHD), COMBINED TYPE: ICD-10-CM

## 2024-10-14 RX ORDER — METHOCARBAMOL 500 MG/1
TABLET, FILM COATED ORAL
Qty: 30 TABLET | Refills: 0 | Status: SHIPPED | OUTPATIENT
Start: 2024-10-14

## 2024-10-14 RX ORDER — DEXTROAMPHETAMINE SACCHARATE, AMPHETAMINE ASPARTATE MONOHYDRATE, DEXTROAMPHETAMINE SULFATE AND AMPHETAMINE SULFATE 2.5; 2.5; 2.5; 2.5 MG/1; MG/1; MG/1; MG/1
10 CAPSULE, EXTENDED RELEASE ORAL EVERY MORNING
Qty: 30 CAPSULE | Refills: 0 | Status: SHIPPED | OUTPATIENT
Start: 2024-10-14

## 2024-10-21 DIAGNOSIS — F41.9 ANXIETY: ICD-10-CM

## 2024-10-22 RX ORDER — FLUOXETINE 10 MG/1
10 TABLET, FILM COATED ORAL DAILY
Qty: 90 TABLET | Refills: 2 | Status: SHIPPED | OUTPATIENT
Start: 2024-10-22

## 2024-11-19 DIAGNOSIS — F90.2 ATTENTION DEFICIT HYPERACTIVITY DISORDER (ADHD), COMBINED TYPE: ICD-10-CM

## 2024-11-20 RX ORDER — DEXTROAMPHETAMINE SACCHARATE, AMPHETAMINE ASPARTATE MONOHYDRATE, DEXTROAMPHETAMINE SULFATE AND AMPHETAMINE SULFATE 2.5; 2.5; 2.5; 2.5 MG/1; MG/1; MG/1; MG/1
10 CAPSULE, EXTENDED RELEASE ORAL EVERY MORNING
Qty: 30 CAPSULE | Refills: 0 | Status: SHIPPED | OUTPATIENT
Start: 2024-11-20

## 2024-12-11 DIAGNOSIS — R58 EXCESSIVE BLEEDING: ICD-10-CM

## 2024-12-11 DIAGNOSIS — L81.1 MELASMA: ICD-10-CM

## 2024-12-11 DIAGNOSIS — M54.32 SCIATICA OF LEFT SIDE: ICD-10-CM

## 2024-12-11 DIAGNOSIS — F90.2 ATTENTION DEFICIT HYPERACTIVITY DISORDER (ADHD), COMBINED TYPE: ICD-10-CM

## 2024-12-12 RX ORDER — TRETINOIN 0.25 MG/G
CREAM TOPICAL
Qty: 45 G | Refills: 0 | Status: SHIPPED | OUTPATIENT
Start: 2024-12-12

## 2024-12-12 RX ORDER — METHOCARBAMOL 500 MG/1
TABLET, FILM COATED ORAL
Qty: 30 TABLET | Refills: 0 | Status: SHIPPED | OUTPATIENT
Start: 2024-12-12

## 2024-12-12 RX ORDER — IBUPROFEN 800 MG/1
800 TABLET, FILM COATED ORAL EVERY 8 HOURS PRN
Qty: 30 TABLET | Refills: 0 | Status: SHIPPED | OUTPATIENT
Start: 2024-12-12

## 2024-12-12 RX ORDER — DEXTROAMPHETAMINE SACCHARATE, AMPHETAMINE ASPARTATE MONOHYDRATE, DEXTROAMPHETAMINE SULFATE AND AMPHETAMINE SULFATE 2.5; 2.5; 2.5; 2.5 MG/1; MG/1; MG/1; MG/1
10 CAPSULE, EXTENDED RELEASE ORAL EVERY MORNING
Qty: 30 CAPSULE | Refills: 0 | Status: SHIPPED | OUTPATIENT
Start: 2024-12-12

## 2024-12-28 DIAGNOSIS — L81.1 MELASMA: ICD-10-CM

## 2024-12-28 DIAGNOSIS — F41.9 ANXIETY: ICD-10-CM

## 2024-12-29 RX ORDER — FLUOXETINE 10 MG/1
10 TABLET, FILM COATED ORAL DAILY
Qty: 90 TABLET | Refills: 0 | Status: SHIPPED | OUTPATIENT
Start: 2024-12-29

## 2024-12-29 RX ORDER — TRETINOIN 0.25 MG/G
CREAM TOPICAL
Qty: 45 G | Refills: 0 | Status: SHIPPED | OUTPATIENT
Start: 2024-12-29

## 2025-01-14 DIAGNOSIS — F41.9 ANXIETY: ICD-10-CM

## 2025-01-15 RX ORDER — FLUOXETINE 10 MG/1
10 TABLET, FILM COATED ORAL DAILY
Qty: 90 TABLET | Refills: 1 | Status: SHIPPED | OUTPATIENT
Start: 2025-01-15 | End: 2025-01-21 | Stop reason: SDUPTHER

## 2025-01-21 DIAGNOSIS — F41.9 ANXIETY: ICD-10-CM

## 2025-01-21 RX ORDER — FLUOXETINE 20 MG/1
20 TABLET, FILM COATED ORAL DAILY
Qty: 90 TABLET | Refills: 3 | Status: SHIPPED | OUTPATIENT
Start: 2025-01-21

## 2025-01-21 NOTE — TELEPHONE ENCOUNTER
Patient called in stating she has been out of her FLUoxetine 10 MG tablet for two weeks. Did let her know that a new one was sent over on 1/15. She said she was never made aware of this.She wants to know if the provider could up her dose to 20 mg as she has been taking [id number] tablets a day.   Please advise and give patient a call back

## 2025-01-21 NOTE — TELEPHONE ENCOUNTER
Called patient and n/a lmov for patient that Tonya is out until tomorrow and will make the changes tomorrow after seeing this message

## 2025-02-10 DIAGNOSIS — L81.1 MELASMA: ICD-10-CM

## 2025-02-11 RX ORDER — TRETINOIN 0.25 MG/G
CREAM TOPICAL
Qty: 45 G | Refills: 1 | Status: SHIPPED | OUTPATIENT
Start: 2025-02-11 | End: 2025-02-14 | Stop reason: SDUPTHER

## 2025-02-12 ENCOUNTER — TELEPHONE (OUTPATIENT)
Dept: FAMILY MEDICINE CLINIC | Facility: CLINIC | Age: 50
End: 2025-02-12

## 2025-02-12 NOTE — TELEPHONE ENCOUNTER
Received fax from [employer] requesting a prior authorization on patients Tretinoin 0.0025% Cream.     Key: [id number]    Request scanned into the patients chart.

## 2025-02-13 NOTE — TELEPHONE ENCOUNTER
PA for  tretinoin (RETIN-A) 0.025 % cream SUBMITTED to [facility]    via    [x]CMM-KEY: [id number]  []Surescripts-Case ID #   []Availity-Auth ID # NDC #   []Faxed to plan   []Other website   []Phone call Case ID #     [x]PA sent as URGENT    All office notes, labs and other pertaining documents and studies sent. Clinical questions answered. Awaiting determination from insurance company.     Turnaround time for your insurance to make a decision on your Prior Authorization can take 7-21 business days.

## 2025-02-14 DIAGNOSIS — F90.2 ATTENTION DEFICIT HYPERACTIVITY DISORDER (ADHD), COMBINED TYPE: ICD-10-CM

## 2025-02-14 DIAGNOSIS — L81.1 MELASMA: ICD-10-CM

## 2025-02-14 NOTE — TELEPHONE ENCOUNTER
PA for     tretinoin (RETIN-A) 0.025 % cream    DENIED    Reason:(Screenshot if applicable)        Message sent to office clinical pool Yes    Denial letter scanned into Media Yes    Appeal started No (Provider will need to decide if appeal is warranted and send clinical documentation to Prior Authorization Team for initiation.)    **Please follow up with your patient regarding denial and next steps**

## 2025-02-18 RX ORDER — TRETINOIN 0.25 MG/G
CREAM TOPICAL
Qty: 45 G | Refills: 0 | Status: SHIPPED | OUTPATIENT
Start: 2025-02-18

## 2025-02-18 RX ORDER — DEXTROAMPHETAMINE SACCHARATE, AMPHETAMINE ASPARTATE MONOHYDRATE, DEXTROAMPHETAMINE SULFATE AND AMPHETAMINE SULFATE 2.5; 2.5; 2.5; 2.5 MG/1; MG/1; MG/1; MG/1
10 CAPSULE, EXTENDED RELEASE ORAL EVERY MORNING
Qty: 30 CAPSULE | Refills: 0 | Status: SHIPPED | OUTPATIENT
Start: 2025-02-18

## 2025-02-19 ENCOUNTER — TELEPHONE (OUTPATIENT)
Dept: FAMILY MEDICINE CLINIC | Facility: CLINIC | Age: 50
End: 2025-02-19

## 2025-02-20 NOTE — TELEPHONE ENCOUNTER
PA for ADDERALL XR, 10MG,) 10 MG 24 hr capsule SUBMITTED to     via    [x]CMM-KEY: [id number]  []Surescripts-Case ID #   []Availity-Auth ID # NDC #   []Faxed to plan   []Other website   []Phone call Case ID #     [x]PA sent as URGENT    All office notes, labs and other pertaining documents and studies sent. Clinical questions answered. Awaiting determination from insurance company.     Turnaround time for your insurance to make a decision on your Prior Authorization can take 7-21 business days.

## 2025-02-21 NOTE — TELEPHONE ENCOUNTER
All clinical documentation was sent with the original PA. Per Denial there was no documentation stating:   Your doctor needs to send us your symptoms to show that your diagnosis of ADHD was made using rules from the Diagnostic and Statistical manual of mental health disorders  Your doctor assessed possible risks for misuse, abuse, or addiction based on your family and social history  Your doctor educated you on the possible side effects and risk with taking this type of drug.     Please have provider provide clinical documentation to support the above, Once this have been obtained the PA department will be able to proceed with the Appeal.

## 2025-02-21 NOTE — TELEPHONE ENCOUNTER
PA for ADDERALL XR, 10MG,) 10 MG 24 hr capsule  DENIED    Reason:(Screenshot if applicable)        Message sent to office clinical pool Yes    Denial letter scanned into Media Yes    Appeal started No (Provider will need to decide if appeal is warranted and send clinical documentation to Prior Authorization Team for initiation.)    **Please follow up with your patient regarding denial and next steps**

## 2025-02-21 NOTE — TELEPHONE ENCOUNTER
There is a ADHD form under media from 11/2023 reporting scale that insurance usually are okay using, that should be good enough to use as well

## 2025-02-21 NOTE — TELEPHONE ENCOUNTER
PA for ADDERALL XR, 10MG RESUBMITTED to [facility]    via    [x]CMM-KEY: [id number]  []Surescripts-Case ID #   []Availity-Auth ID # NDC #   []Faxed to plan   []Other website   []Phone call Case ID #     [x]PA sent as URGENT    All office notes, labs and other pertaining documents and studies sent. Clinical questions answered. Awaiting determination from insurance company.     Turnaround time for your insurance to make a decision on your Prior Authorization can take 7-21 business days.

## 2025-02-25 NOTE — TELEPHONE ENCOUNTER
No auth is needed because patient has primary insurance and a paid claim paid towards the claim per [facility] patient picked medication up on 2/19/2025

## 2025-03-13 DIAGNOSIS — F90.2 ATTENTION DEFICIT HYPERACTIVITY DISORDER (ADHD), COMBINED TYPE: ICD-10-CM

## 2025-03-14 RX ORDER — DEXTROAMPHETAMINE SACCHARATE, AMPHETAMINE ASPARTATE MONOHYDRATE, DEXTROAMPHETAMINE SULFATE AND AMPHETAMINE SULFATE 2.5; 2.5; 2.5; 2.5 MG/1; MG/1; MG/1; MG/1
10 CAPSULE, EXTENDED RELEASE ORAL EVERY MORNING
Qty: 30 CAPSULE | Refills: 0 | Status: SHIPPED | OUTPATIENT
Start: 2025-03-14

## 2025-04-13 DIAGNOSIS — F90.2 ATTENTION DEFICIT HYPERACTIVITY DISORDER (ADHD), COMBINED TYPE: ICD-10-CM

## 2025-04-15 RX ORDER — DEXTROAMPHETAMINE SACCHARATE, AMPHETAMINE ASPARTATE MONOHYDRATE, DEXTROAMPHETAMINE SULFATE AND AMPHETAMINE SULFATE 2.5; 2.5; 2.5; 2.5 MG/1; MG/1; MG/1; MG/1
10 CAPSULE, EXTENDED RELEASE ORAL EVERY MORNING
Qty: 30 CAPSULE | Refills: 0 | Status: SHIPPED | OUTPATIENT
Start: 2025-04-15

## 2025-05-14 DIAGNOSIS — L81.1 MELASMA: ICD-10-CM

## 2025-05-14 DIAGNOSIS — F41.9 ANXIETY: ICD-10-CM

## 2025-05-14 DIAGNOSIS — F90.2 ATTENTION DEFICIT HYPERACTIVITY DISORDER (ADHD), COMBINED TYPE: ICD-10-CM

## 2025-05-14 RX ORDER — DEXTROAMPHETAMINE SACCHARATE, AMPHETAMINE ASPARTATE MONOHYDRATE, DEXTROAMPHETAMINE SULFATE AND AMPHETAMINE SULFATE 2.5; 2.5; 2.5; 2.5 MG/1; MG/1; MG/1; MG/1
10 CAPSULE, EXTENDED RELEASE ORAL EVERY MORNING
Qty: 30 CAPSULE | Refills: 0 | Status: SHIPPED | OUTPATIENT
Start: 2025-05-14

## 2025-05-14 RX ORDER — TRETINOIN 0.25 MG/G
CREAM TOPICAL
Qty: 45 G | Refills: 0 | Status: SHIPPED | OUTPATIENT
Start: 2025-05-14

## 2025-05-14 RX ORDER — FLUOXETINE 20 MG/1
20 TABLET, FILM COATED ORAL DAILY
Qty: 90 TABLET | Refills: 0 | Status: SHIPPED | OUTPATIENT
Start: 2025-05-14

## 2025-07-02 DIAGNOSIS — F90.2 ATTENTION DEFICIT HYPERACTIVITY DISORDER (ADHD), COMBINED TYPE: ICD-10-CM

## 2025-07-02 RX ORDER — DEXTROAMPHETAMINE SACCHARATE, AMPHETAMINE ASPARTATE MONOHYDRATE, DEXTROAMPHETAMINE SULFATE AND AMPHETAMINE SULFATE 2.5; 2.5; 2.5; 2.5 MG/1; MG/1; MG/1; MG/1
10 CAPSULE, EXTENDED RELEASE ORAL EVERY MORNING
Qty: 30 CAPSULE | Refills: 0 | Status: SHIPPED | OUTPATIENT
Start: 2025-07-02

## 2025-07-26 DIAGNOSIS — F90.2 ATTENTION DEFICIT HYPERACTIVITY DISORDER (ADHD), COMBINED TYPE: ICD-10-CM

## 2025-07-29 RX ORDER — DEXTROAMPHETAMINE SACCHARATE, AMPHETAMINE ASPARTATE MONOHYDRATE, DEXTROAMPHETAMINE SULFATE AND AMPHETAMINE SULFATE 2.5; 2.5; 2.5; 2.5 MG/1; MG/1; MG/1; MG/1
10 CAPSULE, EXTENDED RELEASE ORAL EVERY MORNING
Qty: 30 CAPSULE | Refills: 0 | Status: SHIPPED | OUTPATIENT
Start: 2025-07-29

## 2025-08-22 DIAGNOSIS — F41.9 ANXIETY: ICD-10-CM

## 2025-08-22 RX ORDER — FLUOXETINE 20 MG/1
20 TABLET, FILM COATED ORAL DAILY
Qty: 30 TABLET | Refills: 0 | Status: SHIPPED | OUTPATIENT
Start: 2025-08-22